# Patient Record
Sex: FEMALE | Race: BLACK OR AFRICAN AMERICAN
[De-identification: names, ages, dates, MRNs, and addresses within clinical notes are randomized per-mention and may not be internally consistent; named-entity substitution may affect disease eponyms.]

---

## 2015-05-26 VITALS — DIASTOLIC BLOOD PRESSURE: 85 MMHG | SYSTOLIC BLOOD PRESSURE: 128 MMHG

## 2019-03-13 ENCOUNTER — HOSPITAL ENCOUNTER (OUTPATIENT)
Dept: HOSPITAL 61 - KCIC US | Age: 34
Discharge: HOME | End: 2019-03-13
Attending: OBSTETRICS & GYNECOLOGY
Payer: COMMERCIAL

## 2019-03-13 DIAGNOSIS — O09.212: Primary | ICD-10-CM

## 2019-03-13 DIAGNOSIS — O26.842: ICD-10-CM

## 2019-03-13 DIAGNOSIS — Z3A.23: ICD-10-CM

## 2019-03-13 PROCEDURE — 76805 OB US >/= 14 WKS SNGL FETUS: CPT

## 2019-03-13 NOTE — [facility]
EXAM: Obstetrics sonogram.

 

HISTORY: Fetal anatomy survey.

 

TECHNIQUE: Sonographic imaging of a gravid uterus was performed.

 

COMPARISON: None.

 

FINDINGS: There is a single intrauterine fetus in breech presentation with

a heart rate of 157 bpm. There is normal fetal body motion. There is a 

normal anatomic fluid index of 15.5 cm. There is a posterior placenta 

without evidence of placenta previa. The cervix is closed and measures 3.9

cm in length. The fetal stomach, kidneys, bladder, spine, brain, facial 

profile, extremities and heart are unremarkable. There is a three-vessel 

umbilical cord with normal insertion.

 

The biparietal diameter is 5.51 cm, corresponding with 22 weeks and 6 

days. The head circumference is 20.92 cm, corresponding with 23 weeks and 

0 days. The abdominal circumference is 18.77 cm, corresponding with 23 

weeks and 4 days. The femoral length is 4.00 cm, corresponding with 22 

weeks and 6 days. The estimated gestational age based on combined 

ultrasound measurements is 23 weeks and 1 day and the estimated fetal 

weight is 574 g. The estimated fetal weight is at the 6th percentile for 

an estimated gestational age of 26 weeks and 6 days based on LMP. The KAIDEN 

based on ultrasound measurements is 7/9/2019.

 

IMPRESSION:

1. Single intrauterine fetus with an estimated gestational age based on 

ultrasound measurements of 23 weeks and 1 day and heart rate of 157 bpm. 

The estimated fetal weight is at the 6th percentile for an estimated 

gestational age of 26 weeks and 6 days based on LMP. This is likely due to

a long follicular phase and inaccurate ovulation dating.

2. Unremarkable fetal anatomy survey.

 

Electronically signed by: Suki Ruano MD (3/13/2019 11:45 AM) 

[email or web address]